# Patient Record
Sex: FEMALE | Race: BLACK OR AFRICAN AMERICAN | NOT HISPANIC OR LATINO | Employment: UNEMPLOYED | URBAN - METROPOLITAN AREA
[De-identification: names, ages, dates, MRNs, and addresses within clinical notes are randomized per-mention and may not be internally consistent; named-entity substitution may affect disease eponyms.]

---

## 2017-01-04 ENCOUNTER — TELEPHONE (OUTPATIENT)
Dept: PALLIATIVE MEDICINE | Facility: CLINIC | Age: 32
End: 2017-01-04

## 2017-01-04 ENCOUNTER — OFFICE VISIT (OUTPATIENT)
Dept: FAMILY MEDICINE | Facility: CLINIC | Age: 32
End: 2017-01-04
Payer: MEDICAID

## 2017-01-04 VITALS
TEMPERATURE: 98.2 F | SYSTOLIC BLOOD PRESSURE: 100 MMHG | HEIGHT: 68 IN | OXYGEN SATURATION: 100 % | WEIGHT: 154 LBS | HEART RATE: 80 BPM | RESPIRATION RATE: 16 BRPM | DIASTOLIC BLOOD PRESSURE: 80 MMHG | BODY MASS INDEX: 23.34 KG/M2

## 2017-01-04 DIAGNOSIS — G89.29 CHRONIC PAIN OF RIGHT LOWER EXTREMITY: Primary | ICD-10-CM

## 2017-01-04 DIAGNOSIS — M79.604 CHRONIC PAIN OF RIGHT LOWER EXTREMITY: Primary | ICD-10-CM

## 2017-01-04 DIAGNOSIS — Z55.0 ILLITERACY AND LOW-LEVEL LITERACY: ICD-10-CM

## 2017-01-04 DIAGNOSIS — F17.200 TOBACCO USE DISORDER: ICD-10-CM

## 2017-01-04 DIAGNOSIS — J45.30 MILD PERSISTENT ASTHMA WITHOUT COMPLICATION: ICD-10-CM

## 2017-01-04 PROCEDURE — 99203 OFFICE O/P NEW LOW 30 MIN: CPT | Performed by: PHYSICIAN ASSISTANT

## 2017-01-04 RX ORDER — FLUTICASONE PROPIONATE 220 UG/1
2 AEROSOL, METERED RESPIRATORY (INHALATION) 2 TIMES DAILY
Qty: 3 INHALER | Refills: 3 | Status: SHIPPED | OUTPATIENT
Start: 2017-01-04

## 2017-01-04 RX ORDER — ALBUTEROL SULFATE 90 UG/1
2 AEROSOL, METERED RESPIRATORY (INHALATION) EVERY 4 HOURS PRN
Qty: 3 INHALER | Refills: 3 | Status: SHIPPED | OUTPATIENT
Start: 2017-01-04

## 2017-01-04 RX ORDER — ALBUTEROL SULFATE 90 UG/1
2 AEROSOL, METERED RESPIRATORY (INHALATION)
COMMUNITY
Start: 2016-11-06 | End: 2017-01-04

## 2017-01-04 SDOH — EDUCATIONAL SECURITY - EDUCATION ATTAINMENT: ILITERACY AND LOW LEVEL LITERACY: Z55.0
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
## 2017-01-04 NOTE — TELEPHONE ENCOUNTER
Reason for call: Other   Patient called regarding (reason for call): to schedule an appointment with pain management  Additional comments: Notes she has not had an MRI done recently. Referred by ANTONIO Valdivia. Per patient, it's for her right leg. Notes she has screw pins in knee and hip.    Phone Number Pt can be reached at: Home number on file [phone] (home)  Best Time: anytime  Can we leave a detailed message on this number? YES     [place]    [facility]
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
## 2017-01-04 NOTE — Clinical Note
January 16, 2017    Dwayne Gray  [address] [place] [address]    Dear Dwayne                                                                   Welcome to the [facility] at the [facility]. We are located at [address], [address], [place] [address]. Your appointment has been scheduled on Tuesday January 31, 2017 at 3:00 PM with Gretel Welsh NP.    At your first visit, you will meet your team of caregivers who will help you to develop pain management strategies that will last a lifetime. You will meet with our support staff to review your insurance information and collect your co-payment if required by your insurance company. You will meet with a medical pain specialist and care coordinator who will assess your pain and develop a plan of care for your successful pain rehabilitation. You should expect to spend 1-2 hours at your first visit with us. Usually, patients work with us for a period of 6-12 months, and eventually return to their primary doctor once their pain management has stabilized.      To help us make your visit go as smoothly as possible, please bring the following items with you on your visit:   Completed Pain Questionnaire enclosed in this packet.  If you do not bring the completed questionnaire, we may have to reschedule your appointment.  List of any medicines that you are currently taking or have been prescribed  Pertinent NON-[place] medical information such as medical records or tests results (X-rays, or laboratory tests)  Your health insurance card  Financial resources to cover your co-payment or balance due at the time of service (cash, personal check, Visa, and MasterCard are acceptable methods of payment)     Due to the high demand for new patient evaluations, you must notify the scheduling department 48 hours in advance if you are not able to keep this appointment.  Failure to do so  could affect your ability to reschedule with our clinic. Please do not assume that you will receive any prescription medications at your first visit.    Please call [phone] with any questions regarding your appointment. We look forward to meeting you and working to address your health care needs.     Sincerely,    [facility]

## 2017-01-04 NOTE — NURSING NOTE
"Chief Complaint   Patient presents with     Musculoskeletal Problem     right leg pain x2-3 days        Initial /80 mmHg  Pulse 80  Temp(Src) 98.2  F (36.8  C) (Tympanic)  Resp 16  Ht 5' 7.5\" (1.715 m)  Wt 154 lb (69.854 kg)  BMI 23.75 kg/m2  SpO2 100%  LMP  (LMP Unknown) Estimated body mass index is 23.75 kg/(m^2) as calculated from the following:    Height as of this encounter: 5' 7.5\" (1.715 m).    Weight as of this encounter: 154 lb (69.854 kg).  BP completed using cuff size: [facility] [place] CMA      "

## 2017-01-05 ASSESSMENT — ASTHMA QUESTIONNAIRES: ACT_TOTALSCORE: 16

## 2017-01-05 NOTE — TELEPHONE ENCOUNTER
LVM for patient to call back and schedule a new eval.    Jillian Saeed    [facility]

## 2017-01-05 NOTE — TELEPHONE ENCOUNTER
Patient has referral for comprehensive eval and management.    Jillian Saeed    [facility]

## 2017-01-16 NOTE — TELEPHONE ENCOUNTER
Pain Management Center Referral      1. Confirmed address with patient? Yes  2. Confirmed phone number with patient? Yes  3. Confirmed referring provider? Yes  4. Is the PCP the same as the referring provider? Yes  5. Has the patient been to any previous pain clinics? No  (If yes, send EMMANUEL with welcome letter)  6. Which insurance are we to bill for this appointment?  [place]    7. Informed pt of cancellation (48 hour) policy? Yes    REGARDING OPIOID MEDICATIONS: We will always address appropriateness of opioid pain medications, but we generally will not automatically take on a prescribing role. When we do take on prescribing of opioids for chronic pain, it is in collaboration with the referring physician for an intermediate period of time (months), with an expectation that the primary physician or provider will assume the prescribing role if medications are effective at stable doses with demonstrated compliance. Therefore, please do not assume that your prescribing responsibilities end on the day of pain clinic consultation.  7. Informed pt of prescribing policy? Yes      8. Referring Provider: Kandy Leonard PA-C      9. Criteria for Triage Eval:   -Missed/Failed 1st DUAL appointment? N/A   -Medication Focused? N/A   -Mental Health Concerns? (e.g. Recent psych hospitalization/snap shot)? N/A   -Active substance abuse? N/A   -Patient behaviors (e.g. Offensive language/raised voice)? N/A

## 2018-06-08 ENCOUNTER — OFFICE VISIT - HEALTHEAST (OUTPATIENT)
Dept: SURGERY | Facility: CLINIC | Age: 33
End: 2018-06-08

## 2018-06-08 DIAGNOSIS — L02.91 ABSCESS OF SKIN: ICD-10-CM

## 2018-06-08 ASSESSMENT — MIFFLIN-ST. JEOR: SCORE: 1531.83

## 2018-06-18 ENCOUNTER — COMMUNICATION - HEALTHEAST (OUTPATIENT)
Dept: SURGERY | Facility: CLINIC | Age: 33
End: 2018-06-18

## 2021-02-22 ENCOUNTER — COMMUNICATION - HEALTHEAST (OUTPATIENT)
Dept: SCHEDULING | Facility: CLINIC | Age: 36
End: 2021-02-22

## 2021-06-01 VITALS — HEIGHT: 64 IN | BODY MASS INDEX: 32.44 KG/M2 | WEIGHT: 190 LBS

## 2021-06-18 NOTE — PROGRESS NOTES
HPI: Dwayne Gray is a 33 y.o. female referred to see me by No Primary Care Provider for a lump in the left axillary region.  She notes  a several day history of the lump which has progressively enlarged and become exquisitely tender denies any nausea, vomiting, fevers, chills or any other symptoms at present.       Allergies:Buspirone; Aspirin; Ibuprofen; and Tylenol [acetaminophen]    Past Medical History:   Diagnosis Date     Angioedema      Asthma        No past surgical history on file.    CURRENT MEDS:    Current Outpatient Prescriptions:      albuterol (PROVENTIL HFA;VENTOLIN HFA) 90 mcg/actuation inhaler, Inhale 2 puffs every 6 (six) hours as needed for wheezing., Disp: , Rfl:      ALPRAZolam (XANAX) 0.5 MG tablet, Take 1 tablet (0.5 mg total) by mouth 3 (three) times a day as needed (Anxiety)., Disp: 10 tablet, Rfl: 0     cyclopentolate (CYCLOGYL) 1 % ophthalmic solution, Administer 1 drop into the left eye 3 (three) times a day., Disp: , Rfl:      fluticasone (FLOVENT HFA) 220 mcg/actuation inhaler, Inhale 2 puffs as needed., Disp: , Rfl:      fluticasone-salmeterol (ADVAIR DISKUS) 500-50 mcg/dose DISKUS, Inhale 1 puff 2 (two) times a day., Disp: , Rfl:      mometasone-formoterol (DULERA) 100-5 mcg/actuation HFAA inhaler, Inhale 2 puffs as needed., Disp: , Rfl:      oxyCODONE-acetaminophen (PERCOCET) 5-325 mg per tablet, Take 1 tablet by mouth every 4 (four) hours as needed for pain., Disp: , Rfl:      predniSONE (DELTASONE) 20 MG tablet, 3 tabs daily x 4 days, 2 tabs daily x 4 days, 1 tab daily x 4 days, 1/2 tab daily x 4 days, Disp: 26 tablet, Rfl: 0     acetaminophen (TYLENOL) 500 MG tablet, Take 500-1,000 mg by mouth every 6 (six) hours as needed for pain., Disp: , Rfl:     No family history on file.     reports that she has never smoked. She has quit using smokeless tobacco. She reports that she does not drink alcohol.    Review of Systems:  The 12 system review is within normal limits except for  "as mentioned above.  General ROS: No complaints or constitutional symptoms  Ophthalmic ROS: No complaints of visual changes  Skin: As per HPI  Endocrine: No complaints or symptoms  Hematologic/Lymphatic: No symptoms or complaints  Psychiatric: No symptoms or complaints  Respiratory ROS: no cough, shortness of breath, or wheezing  Cardiovascular ROS: no chest pain or dyspnea on exertion  Gastrointestinal ROS: No complaints of pain or other symptoms  Genito-Urinary ROS: no dysuria, trouble voiding, or hematuria  Musculoskeletal ROS: no joint or muscle pain  Neurological ROS: no TIA or stroke symptoms      EXAM:  /44 (Patient Site: Right Arm, Patient Position: Sitting, Cuff Size: Adult Large)  Pulse 80  Ht 5' 4\" (1.626 m)  Wt 190 lb (86.2 kg)  SpO2 99%  Breastfeeding? No  BMI 32.61 kg/m2  GENERAL: Well developed female, No acute distress, pleasant and conversant   EYES: Pupils equal, round and reactive, no scleral icterus  CARDIAC: Regular rate and rhythm, no obvious murmurs noted  CHEST/LUNG: Clear to ascultation bilaterally, No ronchi, No wheezes  ABDOMEN: Soft, non tender, no masses  SKIN: Pink, warm and dry  NEURO:No focal deficits, ambulatory  MUSCULOSKELETAL: Left axillary region-area of accessory breast tissue in the axilla with a small abscess just inferior to this, tender to palpation      LABS:  Lab Results   Component Value Date    WBC 8.9 01/13/2018    HGB 12.0 01/13/2018    HCT 34.1 (L) 01/13/2018    MCV 93 01/13/2018     01/13/2018     INR/Prothrombin Time      No results found for: ALT, AST, GGT, ALKPHOS, BILITOT    Procedure  After verbal consent was obtained the left axillary region was prepped and draped sterile fashion.  Local anesthetic was injected and a small incision was made over the abscess as well as just superior to the abscess cavity.  Purulent material was expressed and sent off the field was cultured.  A small blue vessel loop was placed through both counterincisions and " sutured to itself on the outside.  The wound was clean and covered dry sterile dressing the patient tolerated procedure well.    Assessment/Plan:   Dwayne Gray is a 33 y.o. female with accessory breast tissue in the left axilla with a concomitant inferior abscess.  We were able to drain the abscess and place a vessel loop in the area of concern.  Dressing change instructions were given and the patient will follow-up in 2 weeks for vessel loop removal.      Benjamín Holbrook D.O. [facility]  [phone]  [facility] Department of Surgery